# Patient Record
Sex: FEMALE | Race: WHITE | Employment: FULL TIME | URBAN - METROPOLITAN AREA
[De-identification: names, ages, dates, MRNs, and addresses within clinical notes are randomized per-mention and may not be internally consistent; named-entity substitution may affect disease eponyms.]

---

## 2021-06-06 ENCOUNTER — APPOINTMENT (OUTPATIENT)
Dept: CT IMAGING | Age: 31
End: 2021-06-06
Attending: EMERGENCY MEDICINE
Payer: COMMERCIAL

## 2021-06-06 ENCOUNTER — HOSPITAL ENCOUNTER (EMERGENCY)
Age: 31
Discharge: HOME | End: 2021-06-06
Attending: EMERGENCY MEDICINE
Payer: COMMERCIAL

## 2021-06-06 VITALS
HEART RATE: 78 BPM | SYSTOLIC BLOOD PRESSURE: 107 MMHG | HEIGHT: 62 IN | DIASTOLIC BLOOD PRESSURE: 62 MMHG | OXYGEN SATURATION: 100 % | WEIGHT: 177 LBS | TEMPERATURE: 98.4 F | RESPIRATION RATE: 18 BRPM | BODY MASS INDEX: 32.57 KG/M2

## 2021-06-06 DIAGNOSIS — R74.8 ELEVATED LIVER ENZYMES: ICD-10-CM

## 2021-06-06 DIAGNOSIS — B27.90 INFECTIOUS MONONUCLEOSIS WITHOUT COMPLICATION, INFECTIOUS MONONUCLEOSIS DUE TO UNSPECIFIED ORGANISM: Primary | ICD-10-CM

## 2021-06-06 LAB
ALBUMIN SERPL-MCNC: 3.5 G/DL (ref 3.5–5)
ALBUMIN/GLOB SERPL: 0.7 {RATIO} (ref 1.2–3.5)
ALP SERPL-CCNC: 227 U/L (ref 50–130)
ALT SERPL-CCNC: 622 U/L (ref 12–65)
ANION GAP SERPL CALC-SCNC: 8 MMOL/L (ref 7–16)
AST SERPL-CCNC: 344 U/L (ref 15–37)
BASOPHILS # BLD: 0.5 K/UL (ref 0–0.2)
BASOPHILS NFR BLD: 4 % (ref 0–2)
BILIRUB SERPL-MCNC: 0.7 MG/DL (ref 0.2–1.1)
BUN SERPL-MCNC: 9 MG/DL (ref 6–23)
CALCIUM SERPL-MCNC: 9.5 MG/DL (ref 8.3–10.4)
CHLORIDE SERPL-SCNC: 102 MMOL/L (ref 98–107)
CO2 SERPL-SCNC: 26 MMOL/L (ref 21–32)
CREAT SERPL-MCNC: 0.87 MG/DL (ref 0.6–1)
DIFFERENTIAL METHOD BLD: ABNORMAL
EOSINOPHIL # BLD: 0 K/UL (ref 0–0.8)
EOSINOPHIL NFR BLD: 0 % (ref 0.5–7.8)
ERYTHROCYTE [DISTWIDTH] IN BLOOD BY AUTOMATED COUNT: 13.7 % (ref 11.9–14.6)
GLOBULIN SER CALC-MCNC: 5.2 G/DL (ref 2.3–3.5)
GLUCOSE SERPL-MCNC: 86 MG/DL (ref 65–100)
HCT VFR BLD AUTO: 41.3 % (ref 35.8–46.3)
HETEROPH AB SER QL: POSITIVE
HGB BLD-MCNC: 14 G/DL (ref 11.7–15.4)
IMM GRANULOCYTES # BLD AUTO: 0.1 K/UL (ref 0–0.5)
IMM GRANULOCYTES NFR BLD AUTO: 1 % (ref 0–5)
LYMPHOCYTES # BLD: 7.5 K/UL (ref 0.5–4.6)
LYMPHOCYTES NFR BLD: 58 % (ref 13–44)
MCH RBC QN AUTO: 28.7 PG (ref 26.1–32.9)
MCHC RBC AUTO-ENTMCNC: 33.9 G/DL (ref 31.4–35)
MCV RBC AUTO: 84.8 FL (ref 79.6–97.8)
MONOCYTES # BLD: 1.4 K/UL (ref 0.1–1.3)
MONOCYTES NFR BLD: 11 % (ref 4–12)
NEUTS SEG # BLD: 3.3 K/UL (ref 1.7–8.2)
NEUTS SEG NFR BLD: 26 % (ref 43–78)
NRBC # BLD: 0 K/UL (ref 0–0.2)
PLATELET # BLD AUTO: 152 K/UL (ref 150–450)
PLATELET COMMENTS,PCOM: ADEQUATE
PMV BLD AUTO: 11.6 FL (ref 9.4–12.3)
POTASSIUM SERPL-SCNC: 4.2 MMOL/L (ref 3.5–5.1)
PROT SERPL-MCNC: 8.7 G/DL (ref 6.3–8.2)
RBC # BLD AUTO: 4.87 M/UL (ref 4.05–5.2)
RBC MORPH BLD: ABNORMAL
SODIUM SERPL-SCNC: 136 MMOL/L (ref 136–145)
WBC # BLD AUTO: 12.8 K/UL (ref 4.3–11.1)
WBC MORPH BLD: ABNORMAL

## 2021-06-06 PROCEDURE — 96361 HYDRATE IV INFUSION ADD-ON: CPT

## 2021-06-06 PROCEDURE — 99283 EMERGENCY DEPT VISIT LOW MDM: CPT

## 2021-06-06 PROCEDURE — 80053 COMPREHEN METABOLIC PANEL: CPT

## 2021-06-06 PROCEDURE — 74011000636 HC RX REV CODE- 636: Performed by: EMERGENCY MEDICINE

## 2021-06-06 PROCEDURE — 74011250636 HC RX REV CODE- 250/636: Performed by: PHYSICIAN ASSISTANT

## 2021-06-06 PROCEDURE — 85025 COMPLETE CBC W/AUTO DIFF WBC: CPT

## 2021-06-06 PROCEDURE — 96375 TX/PRO/DX INJ NEW DRUG ADDON: CPT

## 2021-06-06 PROCEDURE — 74011000258 HC RX REV CODE- 258: Performed by: EMERGENCY MEDICINE

## 2021-06-06 PROCEDURE — 70491 CT SOFT TISSUE NECK W/DYE: CPT

## 2021-06-06 PROCEDURE — 86308 HETEROPHILE ANTIBODY SCREEN: CPT

## 2021-06-06 PROCEDURE — 96374 THER/PROPH/DIAG INJ IV PUSH: CPT

## 2021-06-06 RX ORDER — DEXAMETHASONE SODIUM PHOSPHATE 100 MG/10ML
10 INJECTION INTRAMUSCULAR; INTRAVENOUS
Status: COMPLETED | OUTPATIENT
Start: 2021-06-06 | End: 2021-06-06

## 2021-06-06 RX ORDER — SODIUM CHLORIDE 0.9 % (FLUSH) 0.9 %
10 SYRINGE (ML) INJECTION
Status: COMPLETED | OUTPATIENT
Start: 2021-06-06 | End: 2021-06-06

## 2021-06-06 RX ORDER — METHYLPREDNISOLONE 4 MG/1
TABLET ORAL
Qty: 1 DOSE PACK | Refills: 0 | Status: SHIPPED | OUTPATIENT
Start: 2021-06-06 | End: 2021-08-18

## 2021-06-06 RX ORDER — KETOROLAC TROMETHAMINE 30 MG/ML
30 INJECTION, SOLUTION INTRAMUSCULAR; INTRAVENOUS
Status: COMPLETED | OUTPATIENT
Start: 2021-06-06 | End: 2021-06-06

## 2021-06-06 RX ADMIN — SODIUM CHLORIDE 100 ML: 900 INJECTION, SOLUTION INTRAVENOUS at 11:47

## 2021-06-06 RX ADMIN — SODIUM CHLORIDE 1000 ML: 900 INJECTION, SOLUTION INTRAVENOUS at 11:25

## 2021-06-06 RX ADMIN — DEXAMETHASONE SODIUM PHOSPHATE 10 MG: 10 INJECTION INTRAMUSCULAR; INTRAVENOUS at 11:26

## 2021-06-06 RX ADMIN — Medication 10 ML: at 11:47

## 2021-06-06 RX ADMIN — KETOROLAC TROMETHAMINE 30 MG: 30 INJECTION, SOLUTION INTRAMUSCULAR at 11:29

## 2021-06-06 RX ADMIN — IOPAMIDOL 100 ML: 755 INJECTION, SOLUTION INTRAVENOUS at 11:47

## 2021-06-06 NOTE — ED NOTES
I have reviewed discharge instructions with the patient. The patient verbalized understanding. Patient left ED via Discharge Method: ambulatory to Home with (family). Opportunity for questions and clarification provided. Patient given 1 scripts. No esign         To continue your aftercare when you leave the hospital, you may receive an automated call from our care team to check in on how you are doing. This is a free service and part of our promise to provide the best care and service to meet your aftercare needs.  If you have questions, or wish to unsubscribe from this service please call [phone]. Thank you for Choosing our [facility] Emergency Department.

## 2021-06-06 NOTE — DISCHARGE INSTRUCTIONS
Get plenty of rest, increase fluids and warm liquids for throat. Follow up with Family physician for evaluation for re-evaluation and repeat blood work to check liver enzymes.

## 2021-06-06 NOTE — ED TRIAGE NOTES
Pt states sore throat since Tuesday and was treated with antibiotic for strep throat. States right tonsil is still very swollen so sent here from urgent care for evaluation of tonsillar abscess. States she has not been able to eat or drink much this week and feels dehydrated. Masked for triage.

## 2021-06-06 NOTE — ED PROVIDER NOTES
Patient is a 27-year-old female who presents with complaint of sore throat x1 week. Ago patient went to urgent care and tested positive for strep, she was started on a Z-Khurram which she finished this morning due to history of penicillin allergies. She states that she is not feeling improved and her pain is worse on the right and has had trouble swallowing due to the pain and swelling in her throat. Back to urgent care but was sent to the ER with concerns for tonsillar abscess. No fevers or chills. No difficulty breathing. The history is provided by the patient. Sore Throat   Associated symptoms include trouble swallowing. Pertinent negatives include no vomiting, no congestion, no shortness of breath and no cough. History reviewed. No pertinent past medical history. Past Surgical History:   Procedure Laterality Date    HX APPENDECTOMY  2001    HX WISDOM TEETH EXTRACTION  2010         History reviewed. No pertinent family history. Social History     Socioeconomic History    Marital status: SINGLE     Spouse name: Not on file    Number of children: Not on file    Years of education: Not on file    Highest education level: Not on file   Occupational History    Not on file   Tobacco Use    Smoking status: Never Smoker    Smokeless tobacco: Never Used   Substance and Sexual Activity    Alcohol use: Yes     Comment: Occ    Drug use: Not on file    Sexual activity: Not on file   Other Topics Concern    Not on file   Social History Narrative    Not on file     Social Determinants of Health     Financial Resource Strain:     Difficulty of Paying Living Expenses:    Food Insecurity:     Worried About Running Out of Food in the Last Year:     [address] in the Last Year:    Transportation Needs:     Lack of Transportation (Medical):      Lack of Transportation (Non-Medical):    Physical Activity:     Days of Exercise per Week:     Minutes of Exercise per Session:    Stress:     Feeling of Stress :    Social Connections:     Frequency of Communication with Friends and Family:     Frequency of Social Gatherings with Friends and Family:     Attends Yarsanism Services:     Active Member of Clubs or Organizations:     Attends Club or Organization Meetings:     Marital Status:    Intimate Partner Violence:     Fear of Current or Ex-Partner:     Emotionally Abused:     Physically Abused:     Sexually Abused: ALLERGIES: Cefzil [cefprozil], Penicillins, and Sulfa (sulfonamide antibiotics)    Review of Systems   Constitutional: Positive for activity change and appetite change. Negative for chills, fatigue and fever. HENT: Positive for sore throat and trouble swallowing. Negative for congestion and facial swelling. Respiratory: Negative for cough and shortness of breath. Cardiovascular: Negative for chest pain. Gastrointestinal: Negative for abdominal pain, nausea and vomiting. Genitourinary: Negative for dysuria and flank pain. Musculoskeletal: Negative for back pain. Neurological: Negative for light-headedness. Hematological: Positive for adenopathy. Psychiatric/Behavioral: Negative for behavioral problems. Vitals:    06/06/21 0959   BP: 129/88   Pulse: (!) 110   Resp: 20   Temp: 98.4 °F (36.9 °C)   SpO2: 98%   Weight: 80.3 kg (177 lb)   Height: 5' 2\" (1.575 m)            Physical Exam  Vitals and nursing note reviewed. Constitutional:       General: She is not in acute distress. Appearance: She is not toxic-appearing. HENT:      Head: Normocephalic and atraumatic. Mouth/Throat:      Mouth: Mucous membranes are dry. Pharynx: Uvula midline. Tonsils: Tonsillar exudate present. 4+ on the right. 3+ on the left. Comments: No swelling to the palate  Cardiovascular:      Rate and Rhythm: Normal rate and regular rhythm. Pulmonary:      Effort: Pulmonary effort is normal.      Breath sounds: Normal breath sounds.    Abdominal: General: There is no distension. Palpations: Abdomen is soft. Tenderness: There is no abdominal tenderness. Skin:     General: Skin is warm and dry. Neurological:      Mental Status: She is alert and oriented to person, place, and time. Psychiatric:         Mood and Affect: Mood normal.         Behavior: Behavior normal.          MDM  Number of Diagnoses or Management Options  Elevated liver enzymes  Infectious mononucleosis without complication, infectious mononucleosis due to unspecified organism  Diagnosis management comments: Patient presenting with worsening pain and swelling of the throat despite taking full course of antibiotics and history of strep. She was sent over from urgent care with concerns for tonsillar abscess. On exam tonsils and appear enlarged bilaterally L>R with exudates, uvula in midline, no swelling to the soft palate. Tolerating secretions without issue. We will get labs including CBC, CMP, infectious mono and will obtain CT soft tissue neck to evaluate for any abscess. Will give dose of IV Toradol, Decadron and IV fluids. CT soft tissue neck: 1. Tonsils appear swollen and thickened but there is no discrete abscess. 2. Lymphadenopathy, nonspecific and most likely benign reactive in the absence   of clinical suspicion otherwise. Labs reveal positive mono test, mild elevation in WBCs 12, CMP reveals elevation in LFTs. Without any abdominal pain and benign abdominal exam.  Believe elevation in LFTs to be related to acute mono infection. DC home with a short course of steroids for swelling in the throat. Instructed to follow-up with PCP in 1 week for reevaluation and repeat blood work to check liver enzymes. Patient to return to the ED with any worsening fevers, inability to swallow, abdominal pain, or concerning symptoms. And parent verbalized understanding and are agreeable to plan.            Procedures

## 2021-06-07 ENCOUNTER — PATIENT OUTREACH (OUTPATIENT)
Dept: OTHER | Age: 31
End: 2021-06-07

## 2021-06-07 NOTE — PROGRESS NOTES
[facility] progress note    Patient eligible for Catia Lorelei Amin 994 care management  Discussed the care management program.  Patient agrees to care management services at this time. Patient's primary care provider relationship reviewed with patient and modified, as applicable. Care management assessment completed:  Patient is 35 yo female seen 6/2/21 by Trang Romano for complaints of sore throat and fever, diagnosed with Strep Pharyngitis and treated with Z pack due to PCN allergy. Per notes, symptoms worsened and patient was seen in Urgent care on 6/6/21 and referred to ER for further evaluation to rule out abscess. Patient was seen in ER at [facility] on 6/6/21. Work up and treatment plan including labs and CT as below:  CT soft tissue neck: 1. Tonsils appear swollen and thickened but there is no discrete abscess. 2. Lymphadenopathy, nonspecific and most likely benign reactive in the absence of clinical suspicion otherwise.    Labs reveal positive mono test, mild elevation in WBCs 12, CMP reveals elevation in LFTs: SGPT 622;SGOT 344; Alk Phos 227  Without any abdominal pain and benign abdominal exam.  Believe elevation in LFTs to be related to acute mono infection. Patient was treated in ER with Toradol, Decadron and IV fluids. Discharged home with a short course of steroids for swelling in the throat. Instructed to follow-up with PCP in 1 week for reevaluation and repeat blood work to check liver     ACM spoke with patient who reports she is feeling much better, denies any complications or worsening symptoms. Specifically denies any trouble breathing or swallowing. Started Medrol Dose Pack this am as directed. Pushing fluids and resting. 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 Plans to call PCP and schedule follow up with labs repeated within one week. No questions or concerns today. Will plan follow up in 7 to 10 days.       Medications:  New Medications at Discharge: Medrol Dose PAck  Changed Medications at Discharge: none  Discontinued Medications at Discharge: none  Current Outpatient Medications   Medication Sig    methylPREDNISolone (Medrol, Khurram,) 4 mg tablet Take as package instructs     No current facility-administered medications for this visit. There are no discontinued medications. Performed medication reconciliation with patient, and patient verbalizes understanding of administration of home medications. There were no barriers to obtaining medications identified at this time. Preventive Care     Health Maintenance   Topic Date Due    Hepatitis C Screening  Never done    COVID-19 Vaccine (1) Never done    PAP AKA CERVICAL CYTOLOGY  08/30/2017    Flu Vaccine (Season Ended) 09/01/2021    DTaP/Tdap/Td series (2 - Td or Tdap) 08/30/2024    Pneumococcal 0-64 years  Aged [place] Symptoms: Call your physician or 911 if:  · You passed out (lost consciousness). · You have new or worse belly pain. · You have signs of needing more fluids. You have sunken eyes and a dry mouth, and you  · pass only a little urine. · You are dizzy or lightheaded, or you feel like you may faint. · You cannot swallow fluids.     Initial Plan Of Care:  Goal:   Demonstrates no signs of complications or red flags in 30 days;   Review and discuss importance of monitoring and reporting red flags;   Review medications and when taken with patient to ensure understanding;   Educate patient when to call the physician or 911;   Assess for any barriers with care access or mobility needs at home;    Goal:  Completes all follow-up appointments within 7 to 10 days of ED visit;   Educate and encourage importance of FU for prevention of complications or disease progression;   Assess the patients relationship with a PCP and next FU visit scheduled;   Discuss importance of adherence to treatment plan and follow up visits;   Identify any barriers in transportation or access to FU appointments.  Assist patient with making FU appointments as needed;   o Develop list of questions, concerns before visit.  o Importance of knowing your numbers, test results. Barriers/Support system:  patient and mother      Barriers/Challenges to Care: []  Decline in memory    []  Language barrier     []  Emotional                  []  Limited mobility  []  Lack of motivation     [] Vision, hearing or cognitive impairment []  Knowledge [] Financial Barriers []  Lack of support  []  Pain [x]  None    PCP/Specialist follow up: [facility] to scheduled follow up within 7 days     Reviewed red flags with patient, and patient verbalizes understanding. Patient given an opportunity to ask questions. No other clinical/social/functional needs noted. The patient agrees to contact the PCP office for questions related to their healthcare. The patient expressed thanks, offered no additional questions and ended the call.       Plan for next call:about 10 days

## 2021-06-17 ENCOUNTER — PATIENT OUTREACH (OUTPATIENT)
Dept: OTHER | Age: 31
End: 2021-06-17

## 2021-06-30 ENCOUNTER — PATIENT OUTREACH (OUTPATIENT)
Dept: OTHER | Age: 31
End: 2021-06-30

## 2021-07-14 ENCOUNTER — PATIENT OUTREACH (OUTPATIENT)
Dept: OTHER | Age: 31
End: 2021-07-14

## 2021-07-14 NOTE — PROGRESS NOTES
Resolving current episode for case management due to patient lost to follow up. Patient has not been reached after several calls and UTR letter. Final call made  to attempt to contact and discreet message left on voicemail. Letter sent to patient notifying completion of services due to unable to reach. This writer's contact information and information regarding program services included in materials sent. Goals updated to reflect current status as appropriate. Will remain available should patient request re-initiation of case management or transitions of care services.

## 2021-08-19 PROBLEM — Z13.220 LIPID SCREENING: Status: ACTIVE | Noted: 2021-08-19

## 2021-08-19 PROBLEM — Z12.4 CERVICAL CANCER SCREENING: Status: ACTIVE | Noted: 2021-08-19

## 2022-03-18 PROBLEM — Z13.220 LIPID SCREENING: Status: ACTIVE | Noted: 2021-08-19

## 2022-03-18 PROBLEM — Z12.4 CERVICAL CANCER SCREENING: Status: ACTIVE | Noted: 2021-08-19

## 2023-11-15 ENCOUNTER — OFFICE VISIT (OUTPATIENT)
Dept: ORTHOPEDIC SURGERY | Age: 33
End: 2023-11-15

## 2023-11-15 DIAGNOSIS — M25.372 LEFT ANKLE INSTABILITY: ICD-10-CM

## 2023-11-15 DIAGNOSIS — M25.572 PAIN OF JOINT OF LEFT ANKLE AND FOOT: Primary | ICD-10-CM

## 2023-11-15 NOTE — PROGRESS NOTES
illness/condition with exacerbation and progression  Treatment at this time: Time with no intervention  Studies ordered: NO XR needed @ Next Visit    Weight-bearing status: WBAT        Return to work/work restrictions: none  No medications given    She will try carbon fiber plate to alleviate the pain from the stress reaction which likely should resolve. We also discussed relationship for ankle instability to this. She will try home exercise program for this as well. We discussed the potential lateral ankle ligament construct in the future if all conservative treatment fails.